# Patient Record
Sex: MALE | Race: WHITE | NOT HISPANIC OR LATINO | Employment: UNEMPLOYED | URBAN - METROPOLITAN AREA
[De-identification: names, ages, dates, MRNs, and addresses within clinical notes are randomized per-mention and may not be internally consistent; named-entity substitution may affect disease eponyms.]

---

## 2024-07-25 NOTE — PATIENT INSTRUCTIONS
What is a Corn? What is a Callus?    Corns and calluses are areas of thickened skin that develop to protect that area from irritation. They occur when something rubs against the foot repeatedly or causes excess pressure against part of the foot. The term callus commonly is used if the thickening of skin occurs on the bottom of the foot, and if thickening occurs on the top of the foot (or toe), it's called a corn. However, the location of the thickened skin is less important than the pattern of thickening: flat, widespread skin thickening indicates a callus, and skin lesions that are thicker or deeper indicate a corn.    Corns and calluses are not contagious but may become painful if they get too thick. In people with diabetes or decreased circulation, they can lead to more serious foot problems.      Causes  Corns often occur where a toe rubs against the interior of a shoe. Excessive pressure at the balls of the feet--common in women who regularly wear high heels--may cause calluses to develop on the balls of the feet.    People with certain deformities of the foot, such as hammer toes, are prone to corns and calluses.      Symptoms  Corns and calluses typically have a rough, dull appearance. They may be raised or rounded, and they can be hard to differentiate from warts. Corns or calluses sometimes cause pain.      Home Care  Mild corns and calluses may not require treatment. If the corn or callus isn't bothering you, it can probably be left alone. It's a good idea, though, to investigate possible causes of the corn or callus. If your footwear is contributing to the development of a corn or callus, it's time to look for other shoes.    Over-the-counter treatments can do more harm than good, especially if you have any medical conditions such as diabetes. Some over-the-counter treatments contain harsh chemicals, which can lead to burns or even foot ulcers.       When to Visit a Podiatrist  If corns or calluses are  causing pain and discomfort or inhibiting your daily life in any way, see a podiatrist. Also, people with diabetes, poor circulation, or other serious illnesses should have their feet checked.      Diagnosis and Treatment  The podiatrist will conduct a complete examination of your feet. X-rays may be taken; your podiatrist may also want to inspect your shoes and watch you walk. He or she will also take a complete medical history. Corns and calluses are diagnosed based on appearance and history.    If you have mild corns or calluses, your podiatrist may suggest changing your shoes and/or adding padding to your shoes. Larger corns and calluses are most effectively reduced (made smaller) with a surgical blade. A podiatrist can use the blade to carefully shave away the thickened, dead skin--right in the office. The procedure is painless because the skin is already dead. Additional treatments may be needed if the corn or callus recurs.    Cortisone injections into the foot or toe may be given if the corn or callus is causing significant pain. Surgery may be necessary in cases that do not respond to conservative treatment.      Prevention  Wear properly fitted shoes. If you have any deformities of the toe or foot, talk to your podiatrist to find out what shoes are best for you.  Gel pad inserts may decrease friction points and pressure. Your podiatrist can help you determine where pads might be useful.        Your Diabetes Foot Care Program    Every day you depend on your feet to keep you moving. But when you have diabetes, your feet need special care. Even a small foot problem can become very serious. So dont take your feet for granted. By working with your diabetes healthcare team, you can learn how to protect your feet and keep them healthy.  Evaluating your feet  An evaluation helps your healthcare provider check the condition of your feet. The evaluation includes a review of your diabetes history and overall health.  It may also include a foot exam, X-rays, or other tests. These can help show problems beneath the skin that you cant see or feel.  Medical history  You will be asked about your overall health and any history of foot problems. Youll also discuss your diabetes history, such as whether your blood sugar level has changed over time. It also includes questions about sensations of pain, tingling, pins and needles, or numbness. Your healthcare provider will also want to know if you have high blood pressure and heart disease, or if you smoke. Be sure to mention any medicines (including over-the-counter), supplements, or herbal remedies you take.  Foot exam  A foot exam checks the condition of different parts of your foot. First, your skin and nails are examined for any signs of infection. Blood flow is checked by feeling for the pulses in each foot. You may also have tests to study the nerves in the foot. These include using a small filament (wire) to see how sensitive your feet are. In certain cases, you will be asked to walk a short distance to check for bone, joint, and muscle problems.  Diagnostic tests  If needed, your healthcare provider will suggest certain tests to learn more about your feet. These include:  Doppler tests to measure blood flow in the feet and lower leg.  X-rays, which can show bone or joint problems.  Other imaging tests, such as an MRI (magnetic resonance imaging), bone scan, and CT (computed tomography) scan. These can help show bone infections.  Other tests, such as vascular tests, which study the blood flow in your feet and legs. You may also have nerve studies to learn how sensitive your feet are.  Creating a foot care program  Based on the evaluation, your healthcare provider will create a foot care program for you. Your program may be as simple as starting a daily self-care routine and changing the types of shoes your wear. It may also involve treating minor foot problems, such as a corn or  blister. In some cases, surgery will be needed to treat an infection or mechanical problems, such as hammer toes.  Preventing problems  When you have diabetes, its easier to prevent problems than to treat them later on. So see your healthcare team for regular checkups and foot care. Your healthcare team can also help you learn more about caring for your feet at home. For example, you may be told to avoid walking barefoot. Or you may be told that special footwear is needed to protect your feet.  Have regular checkups  Foot problems can develop quickly. So be sure to follow your healthcare teams schedule for regular checkups. During office visits, take off your shoes and socks as soon as you get in the exam room. Ask your healthcare provider to examine your feet for problems. This will make it easier to find and treat small skin irritations before they get worse. Regular checkups can also help keep track of the blood flow and feeling in your feet. If you have neuropathy (lack of feeling in your feet), you will need to have checkups more often.  Learn about self-care  The more you know about diabetes and your feet, the easier it will be to prevent problems. Members of your healthcare team can teach you how to inspect your feet and teach you to look for warning signs. They can also give you other foot care tips. During office visits, be sure to ask any questions you have.  Date Last Reviewed: 7/1/2016  © 7924-7429 [employer] [address] [place] [address], [place] [address]. All rights reserved. This information is not intended as a substitute for professional medical care. Always follow your healthcare professional's instructions.

## 2024-08-01 ENCOUNTER — OFFICE VISIT (OUTPATIENT)
Dept: PODIATRY | Facility: CLINIC | Age: 64
End: 2024-08-01
Payer: MEDICARE

## 2024-08-01 ENCOUNTER — TELEPHONE (OUTPATIENT)
Dept: PODIATRY | Facility: CLINIC | Age: 64
End: 2024-08-01

## 2024-08-01 VITALS — BODY MASS INDEX: 41.3 KG/M2 | HEIGHT: 68 IN | WEIGHT: 272.5 LBS | OXYGEN SATURATION: 98 %

## 2024-08-01 DIAGNOSIS — E11.9 COMPREHENSIVE DIABETIC FOOT EXAMINATION, TYPE 2 DM, ENCOUNTER FOR: Primary | ICD-10-CM

## 2024-08-01 DIAGNOSIS — L84 PRE-ULCERATIVE CORN OR CALLOUS: ICD-10-CM

## 2024-08-01 DIAGNOSIS — L60.2 ONYCHOGRYPHOSIS: ICD-10-CM

## 2024-08-01 DIAGNOSIS — R20.2 PARESTHESIAS: ICD-10-CM

## 2024-08-01 DIAGNOSIS — E66.9 OBESITY, UNSPECIFIED CLASSIFICATION, UNSPECIFIED OBESITY TYPE, UNSPECIFIED WHETHER SERIOUS COMORBIDITY PRESENT: ICD-10-CM

## 2024-08-01 DIAGNOSIS — E11.40 TYPE 2 DIABETES MELLITUS WITH DIABETIC NEUROPATHY, UNSPECIFIED WHETHER LONG TERM INSULIN USE: ICD-10-CM

## 2024-08-01 DIAGNOSIS — L84 CORN OR CALLUS: ICD-10-CM

## 2024-08-01 PROCEDURE — 3288F FALL RISK ASSESSMENT DOCD: CPT | Mod: CPTII,S$GLB,, | Performed by: PODIATRIST

## 2024-08-01 PROCEDURE — 99999 PR PBB SHADOW E&M-NEW PATIENT-LVL III: CPT | Mod: PBBFAC,,, | Performed by: PODIATRIST

## 2024-08-01 PROCEDURE — 1101F PT FALLS ASSESS-DOCD LE1/YR: CPT | Mod: CPTII,S$GLB,, | Performed by: PODIATRIST

## 2024-08-01 PROCEDURE — 1160F RVW MEDS BY RX/DR IN RCRD: CPT | Mod: CPTII,S$GLB,, | Performed by: PODIATRIST

## 2024-08-01 PROCEDURE — 1125F AMNT PAIN NOTED PAIN PRSNT: CPT | Mod: CPTII,S$GLB,, | Performed by: PODIATRIST

## 2024-08-01 PROCEDURE — 99204 OFFICE O/P NEW MOD 45 MIN: CPT | Mod: S$GLB,,, | Performed by: PODIATRIST

## 2024-08-01 PROCEDURE — 3008F BODY MASS INDEX DOCD: CPT | Mod: CPTII,S$GLB,, | Performed by: PODIATRIST

## 2024-08-01 PROCEDURE — 1159F MED LIST DOCD IN RCRD: CPT | Mod: CPTII,S$GLB,, | Performed by: PODIATRIST

## 2024-08-01 NOTE — PROGRESS NOTES
"  [address] [facility] [address]  [place] [address]  Phone: [phone]   [phone]    Patient's PCP:Dinh Ponce MD  Referring Provider: Aaareferral Self    Subjective:      Chief Complaint:: Callouses (RT), Diabetes, Diabetes Mellitus, Diabetic Foot Exam, and Nail Problem    Foot Pain  Pertinent negatives include no abdominal pain, arthralgias, chest pain, chills, coughing, fatigue, fever, headaches, joint swelling, myalgias, nausea, neck pain, numbness, rash or weakness.     Simon Bar is a 64 y.o. male who presents today  for evaluation and treatment of diabetic feet.     Additionally, patient presents with a callus to the right foot.    Additionally, patient presents with thickened dystrophic nails bilateral feet    Systemic Doctor: Dr. Adam Hughes MD  Date Last Seen: last week (per patient)  Blood Sugar: did not check, pt states his sugar is 220+ so he does not check it because he can not afford the medicine  Hemoglobin A1c: does not know    Vitals:    08/01/24 0857   SpO2: 98%   Weight: 123.6 kg (272 lb 7.8 oz)   Height: 5' 8" (1.727 m)   PainSc:   2      Shoe Size: 11     Past Surgical History:   Procedure Laterality Date    BACK SURGERY  1998    [facility]     BONY PELVIS SURGERY  1997 and 1998    CHOLECYSTECTOMY      HERNIA REPAIR       Past Medical History:   Diagnosis Date    Diabetes mellitus     Hyperlipidemia     Hypertension     Low testosterone     Thyroid disease      Family History   Problem Relation Name Age of Onset    Heart attack Mother      Hypertension Mother      Diabetes Mother      Heart attack Father colon cancer     Diabetes Brother      Diabetes Brother          Social History:   Marital Status:   Alcohol History:  reports no history of alcohol use.  Tobacco History:  reports that he has quit smoking. He has never used smokeless tobacco.  Drug History:  reports no history of drug use.    Review of patient's allergies indicates:   Allergen Reactions    Clindamycin " "Anaphylaxis    Contrast media Other (See Comments)     Xray contrast cause kidney problems    Metformin        Current Outpatient Medications   Medication Sig Dispense Refill    aspirin (ECOTRIN) 81 MG EC tablet Take 81 mg by mouth once daily.      BD LUER-CHELY SYRINGE 3 mL 21 x 1 1/2" Syrg   5    BD REGULAR BEVEL NEEDLES 18 x 1 " Ndle   5    CIPRODEX 0.3-0.1 % DrpS   1    DEPO-TESTOSTERONE 100 mg/mL injection   0    glipiZIDE (GLUCOTROL) 10 MG TR24   5    levothyroxine (SYNTHROID) 150 MCG tablet   5    levothyroxine (SYNTHROID, LEVOTHROID) 175 MCG tablet   5    lisinopril (PRINIVIL,ZESTRIL) 40 MG tablet   1    lovastatin (MEVACOR) 40 MG tablet   1    semaglutide (OZEMPIC SUBQ) Inject into the skin.      gabapentin (NEURONTIN) 300 MG capsule Pt states takes 4 times daily (Patient not taking: Reported on 8/1/2024)  0    gabapentin (NEURONTIN) 600 MG tablet  (Patient not taking: Reported on 8/1/2024)  1    hydrocodone-acetaminophen 10-325mg (NORCO)  mg Tab  (Patient not taking: Reported on 8/1/2024)  2    HYDROMET 5-1.5 mg/5 mL Syrp  (Patient not taking: Reported on 8/1/2024)  1    JANUMET XR 50-1,000 mg TM24  (Patient not taking: Reported on 8/1/2024)  5    pioglitazone (ACTOS) 15 MG tablet  (Patient not taking: Reported on 8/1/2024)  5     No current facility-administered medications for this visit.       Review of Systems   Constitutional:  Negative for chills, fatigue, fever and unexpected weight change.   HENT:  Negative for hearing loss and trouble swallowing.    Eyes:  Negative for photophobia and visual disturbance.   Respiratory:  Negative for cough, shortness of breath and wheezing.    Cardiovascular:  Negative for chest pain, palpitations and leg swelling.   Gastrointestinal:  Negative for abdominal pain and nausea.   Genitourinary:  Negative for dysuria and frequency.   Musculoskeletal:  Negative for arthralgias, back pain, gait problem, joint swelling, myalgias and neck pain.   Skin:  Negative for " rash and wound.   Neurological:  Negative for tremors, seizures, weakness, numbness and headaches.   Hematological:  Does not bruise/bleed easily.   Psychiatric/Behavioral:  Negative for hallucinations.          Objective:        Physical Exam:   Foot Exam  Physical Exam  Ortho/SPM Exam   Constitutional: Patient is oriented to person, place, and time. Patient appears well-developed and well-nourished. No acute distress.      Psychiatric: Patient has a normal mood and affect. Patient's speech is normal and behavior is normal. Judgment is normal. Cognition and memory are normal.     Skin is normal age and health appropriate color, turgor, texture, and temperature bilateral lower extremities without ulceration, hyperpigmentation, discoloration, masses nodules or cords palpated.  No ecchymosis, erythema, edema, or cardinal signs of infection bilateral lower extremities.    Focal hyperkeratotic lesion consisting entirely of hyperkeratotic tissue without open skin, drainage, pus, fluctuance, malodor, or signs of infection located right plantar foot       Adequate joint range of motion without pain, limitation, nor crepitation Bilateral feet and ankle joints. Muscle strength is 5/5 in all groups bilaterally.    Paresthesias, and burning bilateral feet with no clearly identified trigger or source.    Toenails 1st, 2nd, 3rd, 4th, 5th  bilateral are hypertrophic, dystrophic, discolored tanish brown with tan, gray crumbly subungual debris.  Tender to distal nail plate pressure, without periungual skin abnormality of each.       Protective sensation intact. Pain sensation intact bilateral feet and legs.    DP and PT pulses 2/4 bilateral, capillary refill 3 seconds all toes/distal feet, all toes/both feet warm to touch.   Negative lower extremity edema bilateral.    Imaging:            Assessment:       1. Comprehensive diabetic foot examination, type 2 DM, encounter for    2. Type 2 diabetes mellitus with diabetic neuropathy,  unspecified whether long term insulin use    3. Obesity, unspecified classification, unspecified obesity type, unspecified whether serious comorbidity present    4. Paresthesias    5. Corn or callus    6. Pre-ulcerative corn or callous    7. Onychogryphosis      Plan:   Comprehensive diabetic foot examination, type 2 DM, encounter for  -      DIABETES FOOT EXAM    Type 2 diabetes mellitus with diabetic neuropathy, unspecified whether long term insulin use  -      DIABETES FOOT EXAM    Obesity, unspecified classification, unspecified obesity type, unspecified whether serious comorbidity present    Paresthesias    Corn or callus    Pre-ulcerative corn or callous    Onychogryphosis      No follow-ups on file.    Procedures          Counseling:     I provided patient education verbally regarding:   Patient diagnosis, treatment options, as well as alternatives, risks, and benefits.     This note was created using Dragon voice recognition software that occasionally misinterpreted phrases or words.     Patient was advised to perform safe debridement of the callus at home with a emery board, nail file or pumice stone.  Patient was advised to apply ointments/moisturizer to the area for softening purposes.       Counseling/Education:  I provided patient education verbally regarding:   The aspects of diabetes and how it pertains to the feet. I explained the importance of proper diabetic foot care and how it is essential for the health of their feet.    I discussed the importance of knowing their Hemoglobin A1c and that the level needs to be as close to 6 as possible. I discussed the increase complications of high blood sugar including stroke, blindness, heart attack, kidney failure and loss of limb secondary to neuropathy and PVD.     With neuropathy, beware of any breaks in the skin or redness. These areas are not recognized early due to the numbness.    I discussed Diabetes, lower back issues, metabolic disorders,  systemic causes, chemotherapy, vitamin deficiency, heavy metal exposure, as some of the causes. I also explained that as much as 40% of the time we can not find a cause. I discussed different treatments available to control the symptoms but which may not cure the problem.       Counseled patient on the aspects of diabetes and how it pertains to the feet.  I explained the importance of proper diabetic foot care and how it is essential for the health of their feet.      Shoe inspection. Patient instructed on proper foot hygeine. We discussed wearing proper shoe gear, daily foot inspections, never walking without protective shoe gear, never putting sharp instruments to feet, routine podiatric nail visits every 2-3 months.        Fungal infection of toenails explained. Treatment options including no treatment, periodic debridement, topical medications, oral medications, and removal of the nail were discussed, as well as success rates and risks of recurrence. We agreed on periodic debridement         Additional patient instructions:     - Check feet daily for wounds and areas of irritation.     - Keep regularly scheduled appointments     - Apply moisturizing cream to feet and ankles daily but not between toes.     - Keep feet clean and dry, especially between toes.     - Elevate feet as much as possible throughout the day.     - Wear supportive/accommodative shoes at all times when ambulating.    - Notify clinic immediately of any new or worsening conditions.    >50% of this > 75 minute visit was spent face to face educating/counseling the patient    I spent a total of 75 minutes on the day of the visit.This includes face to face time and non-face to face time preparing to see the patient (eg, review of tests), obtaining and/or reviewing separately obtained history, documenting clinical information in the electronic or other health record, independently interpreting results and communicating results to the  patient/family/caregiver, or care coordinator.

## 2024-08-01 NOTE — TELEPHONE ENCOUNTER
Pt states his sugar is 220+ and he can not afford his medicine so he is not checking it anymore, I reported this to my manager and the active physician.

## 2025-02-11 ENCOUNTER — TELEPHONE (OUTPATIENT)
Dept: UROLOGY | Facility: CLINIC | Age: 65
End: 2025-02-11
Payer: MEDICARE

## 2025-02-18 ENCOUNTER — LAB VISIT (OUTPATIENT)
Dept: LAB | Facility: HOSPITAL | Age: 65
End: 2025-02-18
Attending: UROLOGY
Payer: MEDICARE

## 2025-02-18 ENCOUNTER — OFFICE VISIT (OUTPATIENT)
Dept: UROLOGY | Facility: CLINIC | Age: 65
End: 2025-02-18
Payer: MEDICARE

## 2025-02-18 VITALS
HEART RATE: 85 BPM | WEIGHT: 268 LBS | HEIGHT: 68 IN | SYSTOLIC BLOOD PRESSURE: 179 MMHG | BODY MASS INDEX: 40.62 KG/M2 | DIASTOLIC BLOOD PRESSURE: 119 MMHG

## 2025-02-18 DIAGNOSIS — N40.1 BENIGN PROSTATIC HYPERPLASIA WITH URINARY OBSTRUCTION: Primary | ICD-10-CM

## 2025-02-18 DIAGNOSIS — N40.1 BENIGN PROSTATIC HYPERPLASIA WITH URINARY OBSTRUCTION: ICD-10-CM

## 2025-02-18 DIAGNOSIS — N39.41 URGE INCONTINENCE: Primary | ICD-10-CM

## 2025-02-18 DIAGNOSIS — N13.8 BENIGN PROSTATIC HYPERPLASIA WITH URINARY OBSTRUCTION: Primary | ICD-10-CM

## 2025-02-18 DIAGNOSIS — N39.41 URGE INCONTINENCE: ICD-10-CM

## 2025-02-18 DIAGNOSIS — N13.8 BENIGN PROSTATIC HYPERPLASIA WITH URINARY OBSTRUCTION: ICD-10-CM

## 2025-02-18 LAB
BACTERIA #/AREA URNS HPF: NORMAL /HPF
BILIRUB UR QL STRIP: NEGATIVE
CLARITY UR: CLEAR
COLOR UR: YELLOW
COMPLEXED PSA SERPL-MCNC: 0.76 NG/ML (ref 0–4)
ESTIMATED AVG GLUCOSE: 266 MG/DL (ref 68–131)
GLUCOSE UR QL STRIP: ABNORMAL
HBA1C MFR BLD: 10.9 % (ref 4.5–6.2)
HGB UR QL STRIP: NEGATIVE
KETONES UR QL STRIP: NEGATIVE
LEUKOCYTE ESTERASE UR QL STRIP: NEGATIVE
MICROSCOPIC COMMENT: NORMAL
NITRITE UR QL STRIP: NEGATIVE
PH UR STRIP: 5 [PH] (ref 5–8)
POC RESIDUAL URINE VOLUME: 8 ML (ref 0–100)
PROT UR QL STRIP: NEGATIVE
RBC #/AREA URNS HPF: 0 /HPF (ref 0–4)
SP GR UR STRIP: >1.03 (ref 1–1.03)
URN SPEC COLLECT METH UR: ABNORMAL
UROBILINOGEN UR STRIP-ACNC: NEGATIVE EU/DL
WBC #/AREA URNS HPF: 1 /HPF (ref 0–5)
YEAST URNS QL MICRO: NORMAL

## 2025-02-18 PROCEDURE — 83036 HEMOGLOBIN GLYCOSYLATED A1C: CPT | Mod: GA | Performed by: UROLOGY

## 2025-02-18 PROCEDURE — 81000 URINALYSIS NONAUTO W/SCOPE: CPT | Performed by: UROLOGY

## 2025-02-18 PROCEDURE — 36415 COLL VENOUS BLD VENIPUNCTURE: CPT | Performed by: UROLOGY

## 2025-02-18 PROCEDURE — 84153 ASSAY OF PSA TOTAL: CPT | Mod: GA | Performed by: UROLOGY

## 2025-02-18 RX ORDER — OXYCODONE AND ACETAMINOPHEN 10; 325 MG/1; MG/1
TABLET ORAL
COMMUNITY
Start: 2024-12-12

## 2025-02-18 RX ORDER — LEVOTHYROXINE SODIUM 200 UG/1
1 TABLET ORAL DAILY
COMMUNITY

## 2025-02-18 RX ORDER — LOSARTAN POTASSIUM AND HYDROCHLOROTHIAZIDE 12.5; 1 MG/1; MG/1
1 TABLET ORAL
COMMUNITY

## 2025-02-18 RX ORDER — ANASTROZOLE 1 MG/1
1 TABLET ORAL
COMMUNITY

## 2025-02-18 RX ORDER — AMLODIPINE BESYLATE 5 MG/1
5 TABLET ORAL
COMMUNITY

## 2025-02-18 RX ORDER — MULTIVITAMIN
1 TABLET ORAL DAILY
COMMUNITY

## 2025-02-18 RX ORDER — GLIPIZIDE 5 MG/1
5 TABLET ORAL
COMMUNITY

## 2025-02-18 RX ORDER — ESCITALOPRAM OXALATE 10 MG/1
10 TABLET ORAL
COMMUNITY

## 2025-02-18 RX ORDER — TAMSULOSIN HYDROCHLORIDE 0.4 MG/1
0.4 CAPSULE ORAL NIGHTLY
Qty: 30 CAPSULE | Refills: 11 | Status: SHIPPED | OUTPATIENT
Start: 2025-02-18 | End: 2026-02-18

## 2025-02-18 NOTE — PROGRESS NOTES
[facility] Urology New Patient/H&P:     Simon Bar is a 64 y.o. male who presents for evaluation of incontinence    Dr Adam Palmer endocrinology in [place] is managing his DM  Has also had him on testosterone replacement for about 2 years. 1c Q2 weeks, and anastrazole.  Recently on lexapro, as started by endocrinology he reports for PE which did improve as  wife and found too quick with new partner.  DM - blood sugar stays 220 without ozempic, Can't afford it so not taking  Previously on jardiance but off of it for years   Reports last A1c was 8.6-9. Has been trending up.  Last time seen by endocrinology was a few months ago, sees him Q3 months.   Unsure of next appointment - notes they just call him a few days prior.   Lives in [place], address in [place], pcp in [place], endocrinologist in [place]  No known family history of bph/prostate cancer  Father  of colon cancer but notes UTD on colonoscopy  Had prior crush injury tractor  Has neuropathy  Needs to urinate every 2 hours.  During the daytime as he is not working he can make it to the bathroom without urinating on himself, but the urge to void wakes him up at night and when he is waking up at night is wet.  Sometimes has a few drips during the daytime.  Never feels empty.  Stands and has to return.  PVR today by bladder scan is 8 cc.  He does note however that he could urinate again.  AUA symptom score:  Janae 2/6 (5: Emptying, frequency, intermittency; 3: Sleeping; 2: Urgency, weak stream)    A few years ago pcp did bloodwork and sent him to a urologist did a lot of things, scoped him etc, and said cancer scare is over but he didn't know there was concern  Has been years. Didn't know who it was. Can ask pcp.  Unsure of his psa but maybe this was the issue.    Snores - has suggested to have ANN eval. Tried test once and bailed. Why have test if wont be compliant    1 pot (8 cups) of coffee in the AM  Sugar free koolaid and water through day  "- 3x koolaid, 7 glasses h20    Past Medical History:   Diagnosis Date    Diabetes mellitus     Heart murmur     Hyperlipidemia     Hypertension     Low testosterone     Thyroid disease     Tumor     ON HIP       Past Surgical History:   Procedure Laterality Date    BACK SURGERY  01/01/1998    [facility]     BONY PELVIS SURGERY  1997 and 1998    CHOLECYSTECTOMY      HERNIA REPAIR      HIP SURGERY      TUMOR REMOVED OFF HIP       Family History   Problem Relation Name Age of Onset    Heart attack Mother      Hypertension Mother      Diabetes Mother      Heart attack Father colon cancer     Diabetes Brother      Diabetes Brother         Social History[1]    Review of patient's allergies indicates:   Allergen Reactions    Clindamycin Anaphylaxis    Contrast media Other (See Comments)     Xray contrast cause kidney problems    Metformin        Medications Reviewed: see MAR    Focused Physical Exam    Vitals:    02/18/25 1029   BP: (!) 179/119   Pulse: 85     Body mass index is 40.75 kg/m². Weight: 121.6 kg (268 lb) Height: 5' 8" (172.7 cm)       Abdomen: Soft, non-tender, nondistended, no CVA tenderness    LABS:    Recent Results (from the past 2 weeks)   POCT Bladder Scan    Collection Time: 02/18/25 10:44 AM   Result Value Ref Range    POC Residual Urine Volume 8 0 - 100 mL         Assessment/Diagnosis:    1. Benign prostatic hyperplasia with urinary obstruction  POCT Urinalysis(Instrument)    POCT Bladder Scan    Urinalysis    Urine Culture High Risk    PSA, Screening    Hemoglobin A1C      2. Urge incontinence            Plans:  Extensive discussion with the patient regarding his urologic complaints.  His primary concern is urinating on himself at this time.  On further discussion it does sound like this is urge incontinence primarily at night when he is waking up with the urge to void as during the day he does have urgency requiring him to void proximally every 2 hours but can make it but sometimes has dribbling.  As well " never feels like he is emptying always has to returned to the bathroom with frequency, though he demonstrates good bladder emptying today.    He is a poorly compliant diabetic, and has been better for a long time.  He does have diabetic neuropathy and we did discuss the longstanding effects of diabetes and neuropathic effect on the bladder as well, but also the irritating effects of glucose in the urine and poorly controlled diabetes leading to urgency and frequency and some of his urinary complaints as well as increasing the risk of urinary tract infection.  He voided prior to arrival so I did not have urine to assess him we will send him to the lab after our visit.  Will check a urinalysis and a urine culture as well as update hemoglobin A1c as he reports it is poor and near 9, as well as a screening PSA as it has a questionable history of urologic evaluation which may have been for elevated PSA.  Unsure.  He does mentioned scope so perhaps this was regarding blood in the urine.  Unknown, and will request any information from his primary care as he is unsure of the circumstances and the provider that he saw.    We did review that he has been on long-term testosterone replacement per endocrinology but I have none of these details either, and certainly he is on enough testosterone that there is necessitated use of anastrozole for peripheral conversion from estrogen.  He does have central abdominal obesity, likely undiagnosed sleep apnea, and all these factors contribute, as well testosterone replacement can worsen undiagnosed sleep apnea, which can increase nighttime urination.  Even though he refused to take the sleep apnea test as above he now reports that they prescribed him a machine and diagnosed him with a but he did not by it and will never be compliant with sleep apnea management, and certainly this will contribute to testosterone, testosterone worsen sleep apnea, we did discuss the pathology of sleep apnea  releasing a diuretic at night increasing nighttime urination.  This is a modifiable risk factor I did encourage him to evaluate and comply with.    We also discuss that testosterone replacement directly contributes to benign growth of the prostate and worsening of obstructive lower urinary tract symptoms.  And as well contributing to worsening sleep apnea which causes nighttime urinary symptoms.  May want to consider stopping testosterone, as he notes he is not completely compliant with it any ways.    As well, we reviewed his conservative recommendations for urgency and frequency as well noting his significant bladder irritant intake throughout the day starting his morning with 8-12 cups of coffee, a pot of coffee, and drinking sugar free Ricardo-Aid throughout the day.  Despite being sugar free, Ricardo-Aid is significantly irritating to the bladder as his coffee and advise him to eliminate Ricardo-Aid continue water and dial down the coffee to 1-2 cups in the morning at maximum.  This in combination with timed voiding throughout the day as his urgency and frequency decrease with modifiable risk factors, glucose control, should significantly improve.  As well, given the nature of his symptoms, and age, there is likely an element of prostate obstruction and therefore recommended starting Flomax 0.4 mg nightly in combination with all of the above conservative recommendations for urgency and frequency, diabetic control, and sleep apnea management.  Common side effects such as retrograde ejaculation and dizziness were reviewed and prescription sent to pharmacy.    Will follow-up labs and urine to see if any further studies or procedures are needed in the interim.  We did briefly review lower tract evaluation with cystoscopy and prostate ultrasound in the future to workup any prostate obstruction as well as assess the bladder and lower urinary tract.  He has had a cystoscopy in the past for an unknown reason, and is not  thrilled to proceed, but would do so if needed.  However prior to any of that, does need to significantly work on the modifiable risk factors and gain more information about his medical history labs and urine.  Will do so, and I have set a three-month follow up with nurse practitioner to re-evaluate his LUTS emptying and incontinence and urgency in the setting of all of the above.  Can further discuss lower tract evaluation or further medical management from there.    Have sent this note to his endocrinologist requesting last few visit notes and labs, as well as to his primary care requesting his PSA history and name and reason for prior urologic evaluation.  If we can receive these records will follow-up as well as review his labs and urine in the interim otherwise all further planning will be made at his three-month visit which was provided to him today in writing as well as after visit summary with all of the urgency frequency recommendations we discussed.  He does also note that he does not answer unknown phone numbers which is why the preop appointment call left on answered, and prefers text, and given this I did activate his BBOXX and sent him the text link to activate and log into BBOXX so he can communicate through the brianne as well.      Total time spent in/on encounter today, including face to face time with patient, counseling, medical record review, interpretation of tests/results, , and treatment plan coordination: 60 minutes    *Visit today included increased complexity associated with the current or anticipated ongoing medical longitudinal care and management related to this patient's serious and/or complex managed problem(s) as described above.         [1]   Social History  Socioeconomic History    Marital status:    Tobacco Use    Smoking status: Former    Smokeless tobacco: Never   Substance and Sexual Activity    Alcohol use: No    Drug use: No    Sexual activity: Yes      Partners: Female

## 2025-02-18 NOTE — PATIENT INSTRUCTIONS
Discussed conservative measures to control urgency and frequency including   1. Avoiding/minimizing bladder irritants (see below), especially in afternoon and evening hours    Discussed bladder irritants include coffe (even decaf), tea, alcohol, soda, spicy foods, acidic juices (orange, tomato), vinegar, and artificial sweeteners/sugary beverages (aka koolaid - even sugar free).  A morning cup of coffee is ok    2. timed voiding - empty on a schedule (approx ~2-3 hours) in spite of need to urinate, to get ahead of urge    3. dont postponing voiding - dont hold it on purpose     4. bowel regimen as distended bowel has extrinsic compressive effect on bladder.   - any or all of the following in any combination, titrate to soft daily bowel movement without pushing or straining  - colace/stool softener capsule - once to twice daily  - miralax - 1 capful daily to start, can increase to 2x daily (or decrease to 1/2 cap daily)  - increase dietary fibery  - fiber supplements, such as metamucil  - prunes, prune juice    5. INCREASE water intake    6. Stop fluids 2 hours before bed, and urinate just before bed    7. CONTROL OF DIABETES    8. ANN evalution

## 2025-03-04 ENCOUNTER — RESULTS FOLLOW-UP (OUTPATIENT)
Dept: UROLOGY | Facility: CLINIC | Age: 65
End: 2025-03-04

## 2025-03-05 NOTE — PROGRESS NOTES
Psa normal but HbA1c significantly elevated and needs to fu with endocrine, imrpove glucose control, limit bladder irritants, and follow all recs from visit and fu with np as planned  
Labored